# Patient Record
Sex: FEMALE | Race: BLACK OR AFRICAN AMERICAN | NOT HISPANIC OR LATINO | Employment: PART TIME | URBAN - METROPOLITAN AREA
[De-identification: names, ages, dates, MRNs, and addresses within clinical notes are randomized per-mention and may not be internally consistent; named-entity substitution may affect disease eponyms.]

---

## 2020-11-16 ENCOUNTER — HISTORICAL (OUTPATIENT)
Dept: ADMINISTRATIVE | Facility: HOSPITAL | Age: 50
End: 2020-11-16

## 2020-11-16 LAB
ERYTHROCYTE [DISTWIDTH] IN BLOOD BY AUTOMATED COUNT: 13.1 % (ref 11.5–14.5)
HCT VFR BLD AUTO: 41.9 % (ref 35–46)
HGB BLD-MCNC: 14.4 GM/DL (ref 12–16)
MCH RBC QN AUTO: 31.6 PG (ref 26–34)
MCHC RBC AUTO-ENTMCNC: 34.4 GM/DL (ref 31–37)
MCV RBC AUTO: 92.1 FL (ref 80–100)
PLATELET # BLD AUTO: 234 X10(3)/MCL (ref 130–400)
PMV BLD AUTO: 10 FL (ref 7.4–10.4)
RBC # BLD AUTO: 4.55 X10(6)/MCL (ref 4–5.2)
WBC # SPEC AUTO: 4.6 X10(3)/MCL (ref 4.5–11)

## 2020-11-18 ENCOUNTER — HISTORICAL (OUTPATIENT)
Dept: SURGERY | Facility: HOSPITAL | Age: 50
End: 2020-11-18

## 2021-01-15 ENCOUNTER — CLINICAL SUPPORT (OUTPATIENT)
Dept: URGENT CARE | Facility: CLINIC | Age: 51
End: 2021-01-15
Payer: COMMERCIAL

## 2021-01-15 VITALS — HEART RATE: 69 BPM | TEMPERATURE: 98 F | OXYGEN SATURATION: 100 % | RESPIRATION RATE: 16 BRPM

## 2021-01-15 DIAGNOSIS — Z11.59 ENCOUNTER FOR SCREENING FOR OTHER VIRAL DISEASES: Primary | ICD-10-CM

## 2021-01-15 LAB
CTP QC/QA: YES
SARS-COV-2 RDRP RESP QL NAA+PROBE: NEGATIVE

## 2021-01-15 PROCEDURE — U0002 COVID-19 LAB TEST NON-CDC: HCPCS | Mod: QW,S$GLB,, | Performed by: NURSE PRACTITIONER

## 2021-01-15 PROCEDURE — U0002: ICD-10-PCS | Mod: QW,S$GLB,, | Performed by: NURSE PRACTITIONER

## 2021-11-04 ENCOUNTER — CLINICAL SUPPORT (OUTPATIENT)
Dept: URGENT CARE | Facility: CLINIC | Age: 51
End: 2021-11-04
Payer: MEDICAID

## 2021-11-04 DIAGNOSIS — Z11.59 SCREENING FOR VIRAL DISEASE: Primary | ICD-10-CM

## 2021-11-04 LAB
CTP QC/QA: YES
SARS-COV-2 RDRP RESP QL NAA+PROBE: NEGATIVE

## 2021-11-04 PROCEDURE — U0002 COVID-19 LAB TEST NON-CDC: HCPCS | Mod: QW,S$GLB,, | Performed by: FAMILY MEDICINE

## 2021-11-04 PROCEDURE — U0002: ICD-10-PCS | Mod: QW,S$GLB,, | Performed by: FAMILY MEDICINE

## 2022-04-10 ENCOUNTER — HISTORICAL (OUTPATIENT)
Dept: ADMINISTRATIVE | Facility: HOSPITAL | Age: 52
End: 2022-04-10
Payer: MEDICAID

## 2022-04-30 VITALS
DIASTOLIC BLOOD PRESSURE: 93 MMHG | BODY MASS INDEX: 28.24 KG/M2 | WEIGHT: 159.38 LBS | HEIGHT: 63 IN | SYSTOLIC BLOOD PRESSURE: 146 MMHG

## 2022-04-30 NOTE — H&P
Patient:   Lety Sorensen             MRN: [id number]            FIN: [id number]               Age:   50 years     Sex:  Female     :  1970   Associated Diagnoses:   None   Author:   Samson Nguyen MD      Lety Sorensen is a 49yo F with a PMH of HTN who underwent a sleeve gastrectomy in . She has lost 110 pounds since the operation. She presents for a panniculectomy. She reports the skin underneath her pannus repeatedly itches and gets infected despite antibiotic creams. Patient has been NPO since midnight.    PMH: HTN  Meds: Amlodipine  All: NKDA  SH: Doesn't smoke or drink alcohol.  Surgical Hx: Lap Leeann, Lap Hysterectomy, Lap Sleeve gastrectomy. I&D peritonsillar abscess.    General: well-developed well-nourished in no acute distress  Respiratory: adequate chest rise bilaterally  Cardiovascular: regular rate and rhythm  Gastrointestinal: soft, non-tender, non-distended with normal bowel sounds, without masses to palpation. Old pot incisions with hypertrophic scar. Large pannus.  Genitourinary: no CVA tenderness to palpation  Musculoskeletal: full range of motion of all extremities/spine without limitation or discomfort  Integumentary: no rashes or skin lesions present  Neurologic: cranial nerves intact, no signs of peripheral neurological deficit, motor/sensory function intact    A/P: 51 yo F with HTN who presents for panniculectomy following 110 pound weight loss since sleeve gastrectomy in .     - To OR for panniculectomy.  - 2g IV ancef OCOR. 5000 Units heparin OCOR.    Samson Nguyen MD  General Surgery [facility]

## 2023-03-29 ENCOUNTER — OFFICE VISIT (OUTPATIENT)
Dept: CARDIOLOGY | Facility: CLINIC | Age: 53
End: 2023-03-29
Payer: MEDICAID

## 2023-03-29 VITALS
OXYGEN SATURATION: 100 % | BODY MASS INDEX: 35.75 KG/M2 | HEART RATE: 83 BPM | DIASTOLIC BLOOD PRESSURE: 90 MMHG | RESPIRATION RATE: 18 BRPM | HEIGHT: 63 IN | SYSTOLIC BLOOD PRESSURE: 122 MMHG | WEIGHT: 201.75 LBS

## 2023-03-29 DIAGNOSIS — Z86.73 HISTORY OF TIA (TRANSIENT ISCHEMIC ATTACK): ICD-10-CM

## 2023-03-29 DIAGNOSIS — R55 SYNCOPE, VASOVAGAL: Primary | ICD-10-CM

## 2023-03-29 DIAGNOSIS — I10 ESSENTIAL HYPERTENSION: ICD-10-CM

## 2023-03-29 PROCEDURE — 93005 ELECTROCARDIOGRAM TRACING: CPT | Mod: PBBFAC | Performed by: INTERNAL MEDICINE

## 2023-03-29 PROCEDURE — 99213 OFFICE O/P EST LOW 20 MIN: CPT | Mod: PBBFAC | Performed by: NURSE PRACTITIONER

## 2023-03-29 PROCEDURE — 4010F PR ACE/ARB THEARPY RXD/TAKEN: ICD-10-PCS | Mod: CPTII,,, | Performed by: NURSE PRACTITIONER

## 2023-03-29 PROCEDURE — 93010 EKG 12-LEAD: ICD-10-PCS | Mod: S$PBB,,, | Performed by: INTERNAL MEDICINE

## 2023-03-29 PROCEDURE — 3080F PR MOST RECENT DIASTOLIC BLOOD PRESSURE >= 90 MM HG: ICD-10-PCS | Mod: CPTII,,, | Performed by: NURSE PRACTITIONER

## 2023-03-29 PROCEDURE — 3080F DIAST BP >= 90 MM HG: CPT | Mod: CPTII,,, | Performed by: NURSE PRACTITIONER

## 2023-03-29 PROCEDURE — 93010 ELECTROCARDIOGRAM REPORT: CPT | Mod: S$PBB,,, | Performed by: INTERNAL MEDICINE

## 2023-03-29 PROCEDURE — 3074F SYST BP LT 130 MM HG: CPT | Mod: CPTII,,, | Performed by: NURSE PRACTITIONER

## 2023-03-29 PROCEDURE — 1159F MED LIST DOCD IN RCRD: CPT | Mod: CPTII,,, | Performed by: NURSE PRACTITIONER

## 2023-03-29 PROCEDURE — 1159F PR MEDICATION LIST DOCUMENTED IN MEDICAL RECORD: ICD-10-PCS | Mod: CPTII,,, | Performed by: NURSE PRACTITIONER

## 2023-03-29 PROCEDURE — 3008F PR BODY MASS INDEX (BMI) DOCUMENTED: ICD-10-PCS | Mod: CPTII,,, | Performed by: NURSE PRACTITIONER

## 2023-03-29 PROCEDURE — 3074F PR MOST RECENT SYSTOLIC BLOOD PRESSURE < 130 MM HG: ICD-10-PCS | Mod: CPTII,,, | Performed by: NURSE PRACTITIONER

## 2023-03-29 PROCEDURE — 4010F ACE/ARB THERAPY RXD/TAKEN: CPT | Mod: CPTII,,, | Performed by: NURSE PRACTITIONER

## 2023-03-29 PROCEDURE — 99999 PR PBB SHADOW E&M-EST. PATIENT-LVL III: ICD-10-PCS | Mod: PBBFAC,,, | Performed by: NURSE PRACTITIONER

## 2023-03-29 PROCEDURE — 3008F BODY MASS INDEX DOCD: CPT | Mod: CPTII,,, | Performed by: NURSE PRACTITIONER

## 2023-03-29 PROCEDURE — 99999 PR PBB SHADOW E&M-EST. PATIENT-LVL III: CPT | Mod: PBBFAC,,, | Performed by: NURSE PRACTITIONER

## 2023-03-29 PROCEDURE — 99204 OFFICE O/P NEW MOD 45 MIN: CPT | Mod: S$PBB,,, | Performed by: NURSE PRACTITIONER

## 2023-03-29 PROCEDURE — 99204 PR OFFICE/OUTPT VISIT, NEW, LEVL IV, 45-59 MIN: ICD-10-PCS | Mod: S$PBB,,, | Performed by: NURSE PRACTITIONER

## 2023-03-29 RX ORDER — BENAZEPRIL HYDROCHLORIDE 40 MG/1
40 TABLET ORAL DAILY
Qty: 90 TABLET | Refills: 3
Start: 2023-03-29 | End: 2024-03-28

## 2023-03-29 RX ORDER — CARVEDILOL 3.12 MG/1
3.12 TABLET ORAL 2 TIMES DAILY WITH MEALS
Qty: 60 TABLET | Refills: 11 | Status: SHIPPED | OUTPATIENT
Start: 2023-03-29 | End: 2023-05-19 | Stop reason: SDUPTHER

## 2023-03-29 RX ORDER — ONDANSETRON 4 MG/1
4 TABLET, ORALLY DISINTEGRATING ORAL EVERY 6 HOURS PRN
COMMUNITY
Start: 2022-10-11

## 2023-03-29 RX ORDER — AMITRIPTYLINE HYDROCHLORIDE 25 MG/1
25 TABLET, FILM COATED ORAL NIGHTLY PRN
COMMUNITY
Start: 2023-03-07

## 2023-03-29 RX ORDER — AMLODIPINE BESYLATE 10 MG/1
10 TABLET ORAL
COMMUNITY
Start: 2023-03-13

## 2023-03-29 RX ORDER — ZOLPIDEM TARTRATE 10 MG/1
10 TABLET ORAL NIGHTLY
COMMUNITY
Start: 2023-03-13

## 2023-03-29 RX ORDER — FUROSEMIDE 20 MG/1
20 TABLET ORAL
COMMUNITY
Start: 2023-02-24 | End: 2023-03-29

## 2023-03-29 NOTE — PROGRESS NOTES
"  [facility]    CC: syncope    Chief Complaint   Patient presents with    Fatigue       Patient ID: Lety Sorensen is a 52 y.o. female with a past medical history of syncope, HTN, TIA.        HPI  Referred by PCP for syncope    3 episodes since Nov 2011; reports prodrome prior to event (SOB, dizziness)     Has extensive work up with neuro including carotid u/s (had 70% luminal narrowing involving the origin of the right internal carotid artery), echo (normal EF, LVH, mod TR), MRI brain (very mild chronic microvascular ischemic changes, near empty sella turcica with pituitary gland compressed- all reviewed and neuro) , EEG (normal)    Some concern for TIA  CTA/doppler neck concerning for right internal stenosis, 70%  Peak velocities reviewed    Placed on ASA/statin     EKG NSR  Non-smoker  + family history heart disease    Hx gastric sleeve 2019  Patient tells me she "eats like a 5 year old," however reports weight gain  Eats a banana for breakfast, microwave noodles for lunch, typically may not eat dinner  Takes diuretic     Not active      Past Medical History:   Diagnosis Date    Anemia     Anxiety     Depression     History of TIA (transient ischemic attack)     Hypertension     Pernicious anemia     Splenomegaly      Past Surgical History:   Procedure Laterality Date    CHOLECYSTECTOMY      HYSTERECTOMY      TUBAL LIGATION       Social History     Socioeconomic History    Marital status: Single   Tobacco Use    Smoking status: Never    Smokeless tobacco: Never   Substance and Sexual Activity    Alcohol use: No     Alcohol/week: 0.0 standard drinks    Drug use: No    Sexual activity: Yes     Partners: Male     Family History   Problem Relation Age of Onset    Early death Mother     Alcohol abuse Father     Diabetes Father     Hypertension Father     Stroke Father     Stomach cancer Maternal Grandmother        Review of patient's allergies indicates:  No Known Allergies    Medication List with " "Changes/Refills   New Medications    BENAZEPRIL (LOTENSIN) 40 MG TABLET    Take 1 tablet (40 mg total) by mouth once daily.    CARVEDILOL (COREG) 3.125 MG TABLET    Take 1 tablet (3.125 mg total) by mouth 2 (two) times daily with meals.   Current Medications    AMITRIPTYLINE (ELAVIL) 25 MG TABLET    Take 25 mg by mouth nightly as needed.    AMLODIPINE (NORVASC) 10 MG TABLET    Take 10 mg by mouth.    ASPIRIN (ECOTRIN) 81 MG EC TABLET    Take 1 tablet (81 mg total) by mouth once daily.    CITALOPRAM (CELEXA) 20 MG TABLET    Take 1 tablet (20 mg total) by mouth once daily.    ONDANSETRON (ZOFRAN-ODT) 4 MG TBDL    Take 4 mg by mouth every 6 (six) hours as needed.    ZOLPIDEM (AMBIEN) 10 MG TAB    Take 10 mg by mouth every evening.   Discontinued Medications    ATENOLOL (TENORMIN) 25 MG TABLET    Take 2 tablets (50 mg total) by mouth once daily.    FUROSEMIDE (LASIX) 20 MG TABLET    Take 20 mg by mouth.    LISINOPRIL-HYDROCHLOROTHIAZIDE (PRINZIDE,ZESTORETIC) 10-12.5 MG PER TABLET    Take 1 tablet by mouth 2 (two) times daily.           Review of Systems   Constitutional: Positive for weight gain.   Musculoskeletal: Negative.    Neurological:  Positive for dizziness, headaches, light-headedness, loss of balance and weakness.     Vitals:    03/29/23 1045   BP: (!) 122/90   Pulse: 83   Resp: 18   SpO2: 100%   Weight: 91.5 kg (201 lb 11.5 oz)   Height: 5' 2.9" (1.598 m)          Physical Exam  Constitutional:       Appearance: She is obese.   HENT:      Head: Normocephalic.   Cardiovascular:      Rate and Rhythm: Normal rate and regular rhythm.      Pulses: Normal pulses.      Heart sounds: Normal heart sounds.   Musculoskeletal:         General: Normal range of motion.   Skin:     General: Skin is warm and dry.   Neurological:      Mental Status: She is alert and oriented to person, place, and time.   Psychiatric:         Mood and Affect: Mood normal.         Labs:  Most Recent Data  CBC:   Lab Results   Component Value " Date    WBC 4.6 11/16/2020    HGB 14.4 11/16/2020    HCT 41.9 11/16/2020     11/16/2020    MCV 92.1 11/16/2020    RDW 13.1 11/16/2020     BMP:   Lab Results   Component Value Date     10/11/2016    K 3.6 10/11/2016     10/11/2016    CO2 25 10/11/2016    BUN 10 10/11/2016    CREATININE 0.4 (L) 10/11/2016     10/11/2016    CALCIUM 8.9 10/11/2016    MG 1.7 09/11/2016    PHOS 4.9 (H) 09/11/2016     LFTS;   Lab Results   Component Value Date    PROT 7.7 10/11/2016    ALBUMIN 4.1 10/11/2016    BILITOT 3.1 (H) 10/11/2016    AST 84 (H) 10/11/2016    ALKPHOS 50 (L) 10/11/2016    ALT 38 10/11/2016     COAGS:   Lab Results   Component Value Date    INR 1.1 09/10/2016     FLP: No results found for: CHOL, HDL, LDLCALC, TRIG, CHOLHDL  CARDIAC:   Lab Results   Component Value Date    TROPONINI <0.01 09/10/2016    BNP 25 09/10/2016     Assessment/Plan:  Problem List Items Addressed This Visit          Neuro    History of TIA (transient ischemic attack)       Cardiac/Vascular    Essential hypertension    Syncope, vasovagal - Primary       I suspect vasovagal syncope given diet.  No intervention warranted given carotid doppler.  Stop diuretic   Start low dose coreg     Continue ASA/statin given concerns for TIA  Holter monitor for syncope    Follow up 2-3 weeks     Total duration of face to face visit time 30 minutes.  Total time spent counseling greater than fifty percent of total visit time.  Counseling included discussion regarding imaging findings, diagnosis, possibilities, treatment options, risks and benefits.  The patient had many questions regarding the options and long-term effects.    YARIEL Bar-C  Cardiology Clinic  [facility]

## 2023-03-30 ENCOUNTER — HOSPITAL ENCOUNTER (OUTPATIENT)
Dept: PULMONOLOGY | Facility: HOSPITAL | Age: 53
Discharge: HOME | End: 2023-03-30
Attending: NURSE PRACTITIONER
Payer: MEDICAID

## 2023-03-30 DIAGNOSIS — R55 SYNCOPE, VASOVAGAL: ICD-10-CM

## 2023-03-30 PROCEDURE — 93227 HOLTER MONITOR - 48 HOUR (CUPID ONLY): ICD-10-PCS | Mod: ,,, | Performed by: INTERNAL MEDICINE

## 2023-03-30 PROCEDURE — 93225 XTRNL ECG REC<48 HRS REC: CPT

## 2023-03-30 PROCEDURE — 93227 XTRNL ECG REC<48 HR R&I: CPT | Mod: ,,, | Performed by: INTERNAL MEDICINE

## 2023-04-03 PROBLEM — R55 SYNCOPE, VASOVAGAL: Status: ACTIVE | Noted: 2023-04-03

## 2023-04-06 LAB
OHS CV EVENT MONITOR DAY: 0
OHS CV HOLTER LENGTH DECIMAL HOURS: 48
OHS CV HOLTER LENGTH HOURS: 48
OHS CV HOLTER LENGTH MINUTES: 0
OHS CV HOLTER SINUS AVERAGE HR: 92
OHS CV HOLTER SINUS MAX HR: 143
OHS CV HOLTER SINUS MIN HR: 65

## 2023-04-10 ENCOUNTER — TELEPHONE (OUTPATIENT)
Dept: CARDIOLOGY | Facility: CLINIC | Age: 53
End: 2023-04-10
Payer: MEDICAID

## 2023-04-10 NOTE — TELEPHONE ENCOUNTER
----- Message from Alice Carrillo MA sent at 4/10/2023  9:46 AM CDT -----    ----- Message -----  From: Piper Phillips NP  Sent: 4/10/2023   9:35 AM CDT  To: Alice Carrillo MA    Holter monitor reveals normal sinus rhythm.

## 2023-04-10 NOTE — TELEPHONE ENCOUNTER
Patient notified that per Piper Phillips NP that her Holter monitor reveals normal sinus rhythm.    Patient expressed understanding.

## 2023-04-19 ENCOUNTER — OFFICE VISIT (OUTPATIENT)
Dept: CARDIOLOGY | Facility: CLINIC | Age: 53
End: 2023-04-19
Payer: MEDICAID

## 2023-04-19 VITALS
DIASTOLIC BLOOD PRESSURE: 90 MMHG | BODY MASS INDEX: 35.35 KG/M2 | SYSTOLIC BLOOD PRESSURE: 148 MMHG | HEIGHT: 63 IN | WEIGHT: 199.5 LBS | HEART RATE: 88 BPM | OXYGEN SATURATION: 99 % | RESPIRATION RATE: 18 BRPM

## 2023-04-19 DIAGNOSIS — I10 ESSENTIAL HYPERTENSION: Primary | ICD-10-CM

## 2023-04-19 DIAGNOSIS — R55 SYNCOPE, VASOVAGAL: ICD-10-CM

## 2023-04-19 PROCEDURE — 3080F PR MOST RECENT DIASTOLIC BLOOD PRESSURE >= 90 MM HG: ICD-10-PCS | Mod: CPTII,,, | Performed by: NURSE PRACTITIONER

## 2023-04-19 PROCEDURE — 99999 PR PBB SHADOW E&M-EST. PATIENT-LVL III: ICD-10-PCS | Mod: PBBFAC,,, | Performed by: NURSE PRACTITIONER

## 2023-04-19 PROCEDURE — 3077F SYST BP >= 140 MM HG: CPT | Mod: CPTII,,, | Performed by: NURSE PRACTITIONER

## 2023-04-19 PROCEDURE — 1159F PR MEDICATION LIST DOCUMENTED IN MEDICAL RECORD: ICD-10-PCS | Mod: CPTII,,, | Performed by: NURSE PRACTITIONER

## 2023-04-19 PROCEDURE — 99213 OFFICE O/P EST LOW 20 MIN: CPT | Mod: PBBFAC | Performed by: NURSE PRACTITIONER

## 2023-04-19 PROCEDURE — 99214 OFFICE O/P EST MOD 30 MIN: CPT | Mod: S$PBB,,, | Performed by: NURSE PRACTITIONER

## 2023-04-19 PROCEDURE — 1159F MED LIST DOCD IN RCRD: CPT | Mod: CPTII,,, | Performed by: NURSE PRACTITIONER

## 2023-04-19 PROCEDURE — 3080F DIAST BP >= 90 MM HG: CPT | Mod: CPTII,,, | Performed by: NURSE PRACTITIONER

## 2023-04-19 PROCEDURE — 4010F ACE/ARB THERAPY RXD/TAKEN: CPT | Mod: CPTII,,, | Performed by: NURSE PRACTITIONER

## 2023-04-19 PROCEDURE — 3008F BODY MASS INDEX DOCD: CPT | Mod: CPTII,,, | Performed by: NURSE PRACTITIONER

## 2023-04-19 PROCEDURE — 99214 PR OFFICE/OUTPT VISIT, EST, LEVL IV, 30-39 MIN: ICD-10-PCS | Mod: S$PBB,,, | Performed by: NURSE PRACTITIONER

## 2023-04-19 PROCEDURE — 99999 PR PBB SHADOW E&M-EST. PATIENT-LVL III: CPT | Mod: PBBFAC,,, | Performed by: NURSE PRACTITIONER

## 2023-04-19 PROCEDURE — 4010F PR ACE/ARB THEARPY RXD/TAKEN: ICD-10-PCS | Mod: CPTII,,, | Performed by: NURSE PRACTITIONER

## 2023-04-19 PROCEDURE — 3077F PR MOST RECENT SYSTOLIC BLOOD PRESSURE >= 140 MM HG: ICD-10-PCS | Mod: CPTII,,, | Performed by: NURSE PRACTITIONER

## 2023-04-19 PROCEDURE — 3008F PR BODY MASS INDEX (BMI) DOCUMENTED: ICD-10-PCS | Mod: CPTII,,, | Performed by: NURSE PRACTITIONER

## 2023-04-19 RX ORDER — PAROXETINE 10 MG/1
10 TABLET, FILM COATED ORAL
COMMUNITY
Start: 2023-04-08

## 2023-04-19 RX ORDER — PHENTERMINE HYDROCHLORIDE 37.5 MG/1
37.5 TABLET ORAL
COMMUNITY
Start: 2023-04-11

## 2023-04-19 NOTE — PROGRESS NOTES
"   Cardiology Clinic note    Subjective:   Patient ID:  Lety Sorensen is a 52 y.o. female who presents for follow-up of vasovagal  syncope, HTN, TIA.      HPI:   Lety Sorensen  has a past medical history of Anemia, Anxiety, Depression, History of TIA (transient ischemic attack), Hypertension, Pernicious anemia, and Splenomegaly.    Referred by PCP for syncope     3 episodes since Nov 2011; reports prodrome prior to event (SOB, dizziness)     Had extensive work up with neuro including carotid u/s (had 70% luminal narrowing involving the origin of the right internal carotid artery), echo (normal EF, LVH, mod TR), MRI brain (very mild chronic microvascular ischemic changes, near empty sella turcica with pituitary gland compressed- all reviewed and neuro) , EEG (normal)     Had some concern for TIA  CTA/doppler neck concerning for right internal stenosis, 70%  Peak velocities reviewed with MD; no concern     Placed on ASA/statin; reports compliance     EKG NSR  Non-smoker  + family history heart disease  Not active     Hx gastric sleeve 2019  Patient tells me she "eats like a 5 year old," however reports weight gain  Eats a banana for breakfast, microwave noodles for lunch, typically may not eat dinner  Had diuretic also     At last visit, diuretic discontinued   It was recommended for patient to eat small portions  Coreg started, however she tells me she only takes it once day; BP remains above goal   Holter monitor ordered which was normal        Of note, patient reports syncope, weakness, dizziness, headaches all resolved since last visit    Patient Active Problem List    Diagnosis Date Noted    Syncope, vasovagal 04/03/2023    Pernicious anemia 10/11/2016    Splenomegaly 10/11/2016    Symptomatic anemia 09/10/2016    Depression 12/22/2015    Essential hypertension 12/22/2015    History of TIA (transient ischemic attack) 12/22/2015       Patient's Medications   New Prescriptions    No medications on file " "  Previous Medications    AMITRIPTYLINE (ELAVIL) 25 MG TABLET    Take 25 mg by mouth nightly as needed.    AMLODIPINE (NORVASC) 10 MG TABLET    Take 10 mg by mouth.    ASPIRIN (ECOTRIN) 81 MG EC TABLET    Take 1 tablet (81 mg total) by mouth once daily.    BENAZEPRIL (LOTENSIN) 40 MG TABLET    Take 1 tablet (40 mg total) by mouth once daily.    CARVEDILOL (COREG) 3.125 MG TABLET    Take 1 tablet (3.125 mg total) by mouth 2 (two) times daily with meals.    CITALOPRAM (CELEXA) 20 MG TABLET    Take 1 tablet (20 mg total) by mouth once daily.    ONDANSETRON (ZOFRAN-ODT) 4 MG TBDL    Take 4 mg by mouth every 6 (six) hours as needed.    PAROXETINE (PAXIL) 10 MG TABLET    Take 10 mg by mouth.    PHENTERMINE (ADIPEX-P) 37.5 MG TABLET    Take 37.5 mg by mouth.    ZOLPIDEM (AMBIEN) 10 MG TAB    Take 10 mg by mouth every evening.   Modified Medications    No medications on file   Discontinued Medications    No medications on file        Review of Systems   Constitutional: Positive for weight gain.   Musculoskeletal: Negative.    Neurological:  Negative for dizziness, headaches, light-headedness, loss of balance and weakness.       Objective:   Vitals  Vitals:    04/19/23 1137   BP: (!) 148/90   Pulse: 88   Resp: 18   SpO2: 99%   Weight: 90.5 kg (199 lb 8.3 oz)   Height: 5' 2.9" (1.598 m)          Physical Exam  Constitutional:       Appearance: She is obese.   HENT:      Head: Normocephalic.   Cardiovascular:      Rate and Rhythm: Normal rate and regular rhythm.      Pulses: Normal pulses.      Heart sounds: Normal heart sounds.   Musculoskeletal:         General: Normal range of motion.   Skin:     General: Skin is warm and dry.   Neurological:      Mental Status: She is alert and oriented to person, place, and time.   Psychiatric:         Mood and Affect: Mood normal.         Lab Results    Lab Results   Component Value Date    WBC 4.6 11/16/2020    WBC 6.00 10/13/2016    HGB 14.4 11/16/2020    HGB 8.2 (L) 10/13/2016    HCT " 41.9 11/16/2020    HCT 24.2 (L) 10/13/2016    MCV 92.1 11/16/2020    MCV 97 10/13/2016       Lab Results   Component Value Date     11/16/2020     (L) 10/13/2016    INR 1.1 09/10/2016       Lab Results   Component Value Date    K 3.6 10/11/2016    MG 1.7 09/11/2016    BUN 10 10/11/2016    CREATININE 0.4 (L) 10/11/2016       Lab Results   Component Value Date     10/11/2016       Lab Results   Component Value Date    AST 84 (H) 10/11/2016    ALT 38 10/11/2016    ALBUMIN 4.1 10/11/2016    PROT 7.7 10/11/2016       No results found for: CHOL, HDL, LDLCALC, TRIG    Lab Results   Component Value Date    BNP 25 09/10/2016         Assessment:     Problem List Items Addressed This Visit          Cardiac/Vascular    Essential hypertension - Primary    Syncope, vasovagal       Plan:     Symptoms resolved  Vasovagal syncope  Increase coreg to 6.25mg BID; educated to take BID    continue with current medical plan and lifestyle changes.      Follow up in 3-4 weeks for BP check    Return sooner for concerns or questions. If symptoms persist go to the ED    She expressed verbal understanding and agreed with the plan    Thank you for the opportunity to care for this patient. Will be available for questions if needed.     Total duration of face to face visit time 30 minutes.  Total time spent counseling greater than fifty percent of total visit time.  Counseling included discussion regarding imaging findings, diagnosis, possibilities, treatment options, risks and benefits.    YARIEL Bar-QUENTIN  Cardiology Clinic  [facility]

## 2023-05-16 ENCOUNTER — OFFICE VISIT (OUTPATIENT)
Dept: CARDIOLOGY | Facility: CLINIC | Age: 53
End: 2023-05-16
Payer: MEDICAID

## 2023-05-16 DIAGNOSIS — R55 SYNCOPE, VASOVAGAL: Primary | ICD-10-CM

## 2023-05-16 DIAGNOSIS — I10 ESSENTIAL HYPERTENSION: ICD-10-CM

## 2023-05-16 PROCEDURE — 99212 OFFICE O/P EST SF 10 MIN: CPT | Mod: PBBFAC | Performed by: NURSE PRACTITIONER

## 2023-05-16 PROCEDURE — 99999 PR PBB SHADOW E&M-EST. PATIENT-LVL II: CPT | Mod: PBBFAC,,, | Performed by: NURSE PRACTITIONER

## 2023-05-16 PROCEDURE — 99214 OFFICE O/P EST MOD 30 MIN: CPT | Mod: S$PBB,,, | Performed by: NURSE PRACTITIONER

## 2023-05-16 PROCEDURE — 4010F PR ACE/ARB THEARPY RXD/TAKEN: ICD-10-PCS | Mod: CPTII,,, | Performed by: NURSE PRACTITIONER

## 2023-05-16 PROCEDURE — 4010F ACE/ARB THERAPY RXD/TAKEN: CPT | Mod: CPTII,,, | Performed by: NURSE PRACTITIONER

## 2023-05-16 PROCEDURE — 99999 PR PBB SHADOW E&M-EST. PATIENT-LVL II: ICD-10-PCS | Mod: PBBFAC,,, | Performed by: NURSE PRACTITIONER

## 2023-05-16 PROCEDURE — 99214 PR OFFICE/OUTPT VISIT, EST, LEVL IV, 30-39 MIN: ICD-10-PCS | Mod: S$PBB,,, | Performed by: NURSE PRACTITIONER

## 2023-05-16 RX ORDER — MELOXICAM 7.5 MG/1
7.5 TABLET ORAL
COMMUNITY
Start: 2023-05-12

## 2023-05-19 ENCOUNTER — OFFICE VISIT (OUTPATIENT)
Dept: CARDIOLOGY | Facility: CLINIC | Age: 53
End: 2023-05-19
Payer: MEDICAID

## 2023-05-19 VITALS
BODY MASS INDEX: 36.6 KG/M2 | HEART RATE: 76 BPM | DIASTOLIC BLOOD PRESSURE: 88 MMHG | HEIGHT: 63 IN | WEIGHT: 206.56 LBS | RESPIRATION RATE: 18 BRPM | SYSTOLIC BLOOD PRESSURE: 150 MMHG | OXYGEN SATURATION: 99 %

## 2023-05-19 DIAGNOSIS — I10 ESSENTIAL HYPERTENSION: Primary | ICD-10-CM

## 2023-05-19 DIAGNOSIS — R55 SYNCOPE, VASOVAGAL: ICD-10-CM

## 2023-05-19 PROCEDURE — 99214 OFFICE O/P EST MOD 30 MIN: CPT | Mod: S$PBB,,, | Performed by: NURSE PRACTITIONER

## 2023-05-19 PROCEDURE — 99999 PR PBB SHADOW E&M-EST. PATIENT-LVL III: CPT | Mod: PBBFAC,,, | Performed by: NURSE PRACTITIONER

## 2023-05-19 PROCEDURE — 99999 PR PBB SHADOW E&M-EST. PATIENT-LVL III: ICD-10-PCS | Mod: PBBFAC,,, | Performed by: NURSE PRACTITIONER

## 2023-05-19 PROCEDURE — 3008F BODY MASS INDEX DOCD: CPT | Mod: CPTII,,, | Performed by: NURSE PRACTITIONER

## 2023-05-19 PROCEDURE — 1159F PR MEDICATION LIST DOCUMENTED IN MEDICAL RECORD: ICD-10-PCS | Mod: CPTII,,, | Performed by: NURSE PRACTITIONER

## 2023-05-19 PROCEDURE — 99214 PR OFFICE/OUTPT VISIT, EST, LEVL IV, 30-39 MIN: ICD-10-PCS | Mod: S$PBB,,, | Performed by: NURSE PRACTITIONER

## 2023-05-19 PROCEDURE — 4010F PR ACE/ARB THEARPY RXD/TAKEN: ICD-10-PCS | Mod: CPTII,,, | Performed by: NURSE PRACTITIONER

## 2023-05-19 PROCEDURE — 3079F DIAST BP 80-89 MM HG: CPT | Mod: CPTII,,, | Performed by: NURSE PRACTITIONER

## 2023-05-19 PROCEDURE — 3077F SYST BP >= 140 MM HG: CPT | Mod: CPTII,,, | Performed by: NURSE PRACTITIONER

## 2023-05-19 PROCEDURE — 3079F PR MOST RECENT DIASTOLIC BLOOD PRESSURE 80-89 MM HG: ICD-10-PCS | Mod: CPTII,,, | Performed by: NURSE PRACTITIONER

## 2023-05-19 PROCEDURE — 99213 OFFICE O/P EST LOW 20 MIN: CPT | Mod: PBBFAC | Performed by: NURSE PRACTITIONER

## 2023-05-19 PROCEDURE — 3077F PR MOST RECENT SYSTOLIC BLOOD PRESSURE >= 140 MM HG: ICD-10-PCS | Mod: CPTII,,, | Performed by: NURSE PRACTITIONER

## 2023-05-19 PROCEDURE — 4010F ACE/ARB THERAPY RXD/TAKEN: CPT | Mod: CPTII,,, | Performed by: NURSE PRACTITIONER

## 2023-05-19 PROCEDURE — 3008F PR BODY MASS INDEX (BMI) DOCUMENTED: ICD-10-PCS | Mod: CPTII,,, | Performed by: NURSE PRACTITIONER

## 2023-05-19 PROCEDURE — 1159F MED LIST DOCD IN RCRD: CPT | Mod: CPTII,,, | Performed by: NURSE PRACTITIONER

## 2023-05-19 RX ORDER — CARVEDILOL 3.12 MG/1
6.25 TABLET ORAL 2 TIMES DAILY WITH MEALS
Qty: 120 TABLET | Refills: 11 | Status: SHIPPED | OUTPATIENT
Start: 2023-05-19 | End: 2024-05-18

## 2023-05-19 NOTE — PROGRESS NOTES
Cardiology Clinic note    Subjective:   Patient ID:  Lety Sorensen is a 52 y.o. female who presents for follow-up of HTN, vasovagal syncope    HPI:   Lety Sorensen  has a past medical history of Anemia, Anxiety, Depression, History of TIA (transient ischemic attack), Hypertension, Pernicious anemia, and Splenomegaly.    Referred by PCP for syncope; sees Dr. Martinez in [place]  3 episodes since Nov 2011; reports prodrome prior to event (SOB, dizziness)     Had extensive work up with neuro including carotid u/s (had 70% luminal narrowing involving the origin of the right internal carotid artery), echo (normal EF, LVH, mod TR), MRI brain (very mild chronic microvascular ischemic changes, near empty sella turcica with pituitary gland compressed- all reviewed and neuro) , EEG (normal)     Had some concern for TIA  CTA/doppler neck concerning for right internal stenosis, 70%  Peak velocities reviewed with MD; no concern     Placed on ASA/statin; reports compliance     EKG NSR  Non-smoker  + family history heart disease  Not active      Hx gastric sleeve 2019  Decreases appetite  Eats a banana for breakfast, microwave noodles for lunch, typically may not eat dinner  Had diuretic also, which was stopped  I recommended for patient to eat small portions, which spouse states she still does not eat well  No further syncope or pre-syncope, headaches or dizzines   Holter was normal     BP elevated today but patient admits to taking BP medicine minutes prior to clinic visit        Patient Active Problem List    Diagnosis Date Noted    Syncope, vasovagal 04/03/2023    Pernicious anemia 10/11/2016    Splenomegaly 10/11/2016    Symptomatic anemia 09/10/2016    Depression 12/22/2015    Essential hypertension 12/22/2015    History of TIA (transient ischemic attack) 12/22/2015       Patient's Medications   New Prescriptions    No medications on file   Previous Medications    AMITRIPTYLINE (ELAVIL) 25 MG TABLET    Take 25  "mg by mouth nightly as needed.    AMLODIPINE (NORVASC) 10 MG TABLET    Take 10 mg by mouth.    ASPIRIN (ECOTRIN) 81 MG EC TABLET    Take 1 tablet (81 mg total) by mouth once daily.    BENAZEPRIL (LOTENSIN) 40 MG TABLET    Take 1 tablet (40 mg total) by mouth once daily.    CARVEDILOL (COREG) 3.125 MG TABLET    Take 1 tablet (3.125 mg total) by mouth 2 (two) times daily with meals.    CITALOPRAM (CELEXA) 20 MG TABLET    Take 1 tablet (20 mg total) by mouth once daily.    MELOXICAM (MOBIC) 7.5 MG TABLET    Take 7.5 mg by mouth.    ONDANSETRON (ZOFRAN-ODT) 4 MG TBDL    Take 4 mg by mouth every 6 (six) hours as needed.    PAROXETINE (PAXIL) 10 MG TABLET    Take 10 mg by mouth.    PHENTERMINE (ADIPEX-P) 37.5 MG TABLET    Take 37.5 mg by mouth.    ZOLPIDEM (AMBIEN) 10 MG TAB    Take 10 mg by mouth every evening.   Modified Medications    No medications on file   Discontinued Medications    No medications on file        Review of Systems   Constitutional: Negative for weight gain.   Cardiovascular: Negative.    Respiratory: Negative.     Musculoskeletal: Negative.    Neurological:  Negative for dizziness, headaches, light-headedness, loss of balance and weakness.       Objective:   Vitals  Vitals:    05/19/23 1338   BP: (!) 150/88   Pulse: 76   Resp: 18   SpO2: 99%   Weight: 93.7 kg (206 lb 9.1 oz)   Height: 5' 2.9" (1.598 m)          Physical Exam  Constitutional:       Appearance: She is obese.   HENT:      Head: Normocephalic.   Cardiovascular:      Rate and Rhythm: Normal rate and regular rhythm.      Pulses: Normal pulses.      Heart sounds: Normal heart sounds.   Musculoskeletal:         General: Normal range of motion.   Skin:     General: Skin is warm and dry.   Neurological:      Mental Status: She is alert and oriented to person, place, and time.   Psychiatric:         Mood and Affect: Mood normal.         Lab Results    Lab Results   Component Value Date    WBC 4.6 11/16/2020    WBC 6.00 10/13/2016    HGB 14.4 " 11/16/2020    HGB 8.2 (L) 10/13/2016    HCT 41.9 11/16/2020    HCT 24.2 (L) 10/13/2016    MCV 92.1 11/16/2020    MCV 97 10/13/2016       Lab Results   Component Value Date     11/16/2020     (L) 10/13/2016    INR 1.1 09/10/2016       Lab Results   Component Value Date    K 3.6 10/11/2016    MG 1.7 09/11/2016    BUN 10 10/11/2016    CREATININE 0.4 (L) 10/11/2016       Lab Results   Component Value Date     10/11/2016       Lab Results   Component Value Date    AST 84 (H) 10/11/2016    ALT 38 10/11/2016    ALBUMIN 4.1 10/11/2016    PROT 7.7 10/11/2016       No results found for: CHOL, HDL, LDLCALC, TRIG    Lab Results   Component Value Date    BNP 25 09/10/2016       Assessment:     Problem List Items Addressed This Visit          Cardiac/Vascular    Essential hypertension - Primary    Syncope, vasovagal       Plan:     Patient is doing well from cardiac standpoint  No further syncope or pre-syncope complaints    BP elevated, however took medicine minutes ago prior to visit  I will not make any changes to current regimen, given this  Advised to continue to monitor at home and notify if BP >130/80    Continue with current medical plan and lifestyle changes.      No orders of the defined types were placed in this encounter.      Follow up in 3 months   Return sooner for concerns or questions. If symptoms persist go to the ED    She expressed verbal understanding and agreed with the plan    Thank you for the opportunity to care for this patient. Will be available for questions if needed.     Total duration of face to face visit time 30 minutes.  Total time spent counseling greater than fifty percent of total visit time.  Counseling included discussion regarding imaging findings, diagnosis, possibilities, treatment options, risks and benefits.    Piper Phillips, YARIEL-C  Cardiology Clinic  [facility]

## 2023-06-01 NOTE — PROGRESS NOTES
Cardiology Clinic note    Subjective:   Patient ID:  Lety Sorensen is a 52 y.o. female who presents for follow-up of vasovagal syncope, HTN, TIA    HPI:   Lety Sorensen  has a past medical history of Anemia, Anxiety, Depression, History of TIA (transient ischemic attack), Hypertension, Pernicious anemia, and Splenomegaly.    Referred by PCP for syncope  3 episodes since Nov 2011; reports prodrome prior to event (SOB, dizziness)     Had extensive work up with neuro including carotid u/s (had 70% luminal narrowing involving the origin of the right internal carotid artery), echo (normal EF, LVH, mod TR), MRI brain (very mild chronic microvascular ischemic changes, near empty sella turcica with pituitary gland compressed- all reviewed and neuro) , EEG (normal)     Some concern for TIA  CTA/doppler neck concerning for right internal stenosis, 70%  Peak velocities reviewed    Holter monitor 3/30/2023- neg      Placed on ASA/statin      EKG NSR  Non-smoker  + family history heart disease     Decrease appetite given hx gastric sleeve 2019  Diuretic stopped due to concerns for vasovagal syncope; started on low dose coreg   BP elevated today; she did not take medication this am      Not active      Patient Active Problem List    Diagnosis Date Noted    Syncope, vasovagal 04/03/2023    Pernicious anemia 10/11/2016    Splenomegaly 10/11/2016    Symptomatic anemia 09/10/2016    Depression 12/22/2015    Essential hypertension 12/22/2015    History of TIA (transient ischemic attack) 12/22/2015       Patient's Medications   New Prescriptions    No medications on file   Previous Medications    AMITRIPTYLINE (ELAVIL) 25 MG TABLET    Take 25 mg by mouth nightly as needed.    AMLODIPINE (NORVASC) 10 MG TABLET    Take 10 mg by mouth.    ASPIRIN (ECOTRIN) 81 MG EC TABLET    Take 1 tablet (81 mg total) by mouth once daily.    BENAZEPRIL (LOTENSIN) 40 MG TABLET    Take 1 tablet (40 mg total) by mouth once daily.    CITALOPRAM  (CELEXA) 20 MG TABLET    Take 1 tablet (20 mg total) by mouth once daily.    MELOXICAM (MOBIC) 7.5 MG TABLET    Take 7.5 mg by mouth.    ONDANSETRON (ZOFRAN-ODT) 4 MG TBDL    Take 4 mg by mouth every 6 (six) hours as needed.    PAROXETINE (PAXIL) 10 MG TABLET    Take 10 mg by mouth.    PHENTERMINE (ADIPEX-P) 37.5 MG TABLET    Take 37.5 mg by mouth.    ZOLPIDEM (AMBIEN) 10 MG TAB    Take 10 mg by mouth every evening.   Modified Medications    Modified Medication Previous Medication    CARVEDILOL (COREG) 3.125 MG TABLET carvediloL (COREG) 3.125 MG tablet       Take 2 tablets (6.25 mg total) by mouth 2 (two) times daily with meals.    Take 1 tablet (3.125 mg total) by mouth 2 (two) times daily with meals.   Discontinued Medications    No medications on file        Review of Systems   Constitutional: Positive for weight gain.   Musculoskeletal: Negative.    Neurological:  Negative for dizziness, headaches, light-headedness, loss of balance and weakness.       Objective:   Vitals  There were no vitals filed for this visit.       Physical Exam  Constitutional:       Appearance: She is obese.   HENT:      Head: Normocephalic.   Cardiovascular:      Rate and Rhythm: Normal rate and regular rhythm.      Pulses: Normal pulses.      Heart sounds: Normal heart sounds.   Musculoskeletal:         General: Normal range of motion.   Skin:     General: Skin is warm and dry.   Neurological:      Mental Status: She is alert and oriented to person, place, and time.   Psychiatric:         Mood and Affect: Mood normal.         Lab Results    Lab Results   Component Value Date    WBC 4.6 11/16/2020    WBC 6.00 10/13/2016    HGB 14.4 11/16/2020    HGB 8.2 (L) 10/13/2016    HCT 41.9 11/16/2020    HCT 24.2 (L) 10/13/2016    MCV 92.1 11/16/2020    MCV 97 10/13/2016       Lab Results   Component Value Date     11/16/2020     (L) 10/13/2016    INR 1.1 09/10/2016       Lab Results   Component Value Date    K 3.6 10/11/2016    MG  1.7 09/11/2016    BUN 10 10/11/2016    CREATININE 0.4 (L) 10/11/2016       Lab Results   Component Value Date     10/11/2016       Lab Results   Component Value Date    AST 84 (H) 10/11/2016    ALT 38 10/11/2016    ALBUMIN 4.1 10/11/2016    PROT 7.7 10/11/2016       No results found for: CHOL, HDL, LDLCALC, TRIG    Lab Results   Component Value Date    BNP 25 09/10/2016       Assessment:     Problem List Items Addressed This Visit          Cardiac/Vascular    Essential hypertension    Syncope, vasovagal - Primary       Plan:     Patient doing well   No further vasovagal events since diet change    Continue with current medical plan and lifestyle changes.      No orders of the defined types were placed in this encounter.      Follow up in 3 months   Return sooner for concerns or questions. If symptoms persist go to the ED    She expressed verbal understanding and agreed with the plan    Thank you for the opportunity to care for this patient. Will be available for questions if needed.     Total duration of face to face visit time 30 minutes.  Total time spent counseling greater than fifty percent of total visit time.  Counseling included discussion regarding imaging findings, diagnosis, possibilities, treatment options, risks and benefits.    YARIEL Bar-QUENTIN  Cardiology Clinic  [facility]

## 2025-05-11 NOTE — LETTER
April 19, 2023        Maribel Martinez MD  [address]  [place] [address]             [facility]  [address] [place] [id number]  Phone: [phone]  Fax: [phone]   Patient: Lety Sorensen   MR Number: [id number]   YOB: 1970   Date of Visit: 4/19/2023       Dear Dr. Martinez:    Thank you for referring Lety Sorensen to me for evaluation. Attached you will find relevant portions of my assessment and plan of care.    If you have questions, please do not hesitate to call me. I look forward to following Lety Sorensen along with you.    Sincerely,      Piper Phillips, TREVOR            CC    No Recipients    Enclosure          No